# Patient Record
Sex: MALE | Race: WHITE | NOT HISPANIC OR LATINO | URBAN - METROPOLITAN AREA
[De-identification: names, ages, dates, MRNs, and addresses within clinical notes are randomized per-mention and may not be internally consistent; named-entity substitution may affect disease eponyms.]

---

## 2017-01-31 ENCOUNTER — INPATIENT (INPATIENT)
Facility: HOSPITAL | Age: 42
LOS: 0 days | Discharge: HOME | End: 2017-02-01
Attending: INTERNAL MEDICINE | Admitting: INTERNAL MEDICINE
Payer: COMMERCIAL

## 2017-01-31 VITALS
TEMPERATURE: 98 F | OXYGEN SATURATION: 100 % | SYSTOLIC BLOOD PRESSURE: 129 MMHG | RESPIRATION RATE: 16 BRPM | DIASTOLIC BLOOD PRESSURE: 88 MMHG | HEART RATE: 72 BPM

## 2017-01-31 DIAGNOSIS — R55 SYNCOPE AND COLLAPSE: ICD-10-CM

## 2017-01-31 DIAGNOSIS — Z98.890 OTHER SPECIFIED POSTPROCEDURAL STATES: Chronic | ICD-10-CM

## 2017-01-31 DIAGNOSIS — I48.91 UNSPECIFIED ATRIAL FIBRILLATION: ICD-10-CM

## 2017-01-31 DIAGNOSIS — Z41.8 ENCOUNTER FOR OTHER PROCEDURES FOR PURPOSES OTHER THAN REMEDYING HEALTH STATE: ICD-10-CM

## 2017-01-31 LAB
ALBUMIN SERPL ELPH-MCNC: 3.8 G/DL — SIGNIFICANT CHANGE UP (ref 3.3–5)
ALBUMIN SERPL ELPH-MCNC: 3.9 G/DL — SIGNIFICANT CHANGE UP (ref 3.3–5)
ALP SERPL-CCNC: 40 U/L — SIGNIFICANT CHANGE UP (ref 40–120)
ALP SERPL-CCNC: 43 U/L — SIGNIFICANT CHANGE UP (ref 40–120)
ALT FLD-CCNC: 10 U/L — SIGNIFICANT CHANGE UP (ref 4–41)
ALT FLD-CCNC: 11 U/L — SIGNIFICANT CHANGE UP (ref 4–41)
APTT BLD: 26.7 SEC — LOW (ref 27.5–37.4)
APTT BLD: 63 SEC — HIGH (ref 27.5–37.4)
APTT BLD: 84 SEC — HIGH (ref 27.5–37.4)
AST SERPL-CCNC: 14 U/L — SIGNIFICANT CHANGE UP (ref 4–40)
AST SERPL-CCNC: 16 U/L — SIGNIFICANT CHANGE UP (ref 4–40)
BASE EXCESS BLDV CALC-SCNC: 1 MMOL/L — SIGNIFICANT CHANGE UP
BASOPHILS # BLD AUTO: 0.03 K/UL — SIGNIFICANT CHANGE UP (ref 0–0.2)
BASOPHILS NFR BLD AUTO: 0.3 % — SIGNIFICANT CHANGE UP (ref 0–2)
BILIRUB SERPL-MCNC: 0.9 MG/DL — SIGNIFICANT CHANGE UP (ref 0.2–1.2)
BILIRUB SERPL-MCNC: 1.1 MG/DL — SIGNIFICANT CHANGE UP (ref 0.2–1.2)
BLOOD GAS VENOUS - CREATININE: 0.87 MG/DL — SIGNIFICANT CHANGE UP (ref 0.5–1.3)
BUN SERPL-MCNC: 18 MG/DL — SIGNIFICANT CHANGE UP (ref 7–23)
BUN SERPL-MCNC: 21 MG/DL — SIGNIFICANT CHANGE UP (ref 7–23)
CALCIUM SERPL-MCNC: 8.8 MG/DL — SIGNIFICANT CHANGE UP (ref 8.4–10.5)
CALCIUM SERPL-MCNC: 8.9 MG/DL — SIGNIFICANT CHANGE UP (ref 8.4–10.5)
CHLORIDE BLDV-SCNC: 108 MMOL/L — SIGNIFICANT CHANGE UP (ref 96–108)
CHLORIDE SERPL-SCNC: 104 MMOL/L — SIGNIFICANT CHANGE UP (ref 98–107)
CHLORIDE SERPL-SCNC: 105 MMOL/L — SIGNIFICANT CHANGE UP (ref 98–107)
CHOLEST SERPL-MCNC: 132 MG/DL — SIGNIFICANT CHANGE UP (ref 120–199)
CK MB BLD-MCNC: 3.23 NG/ML — SIGNIFICANT CHANGE UP (ref 1–6.6)
CK MB BLD-MCNC: 3.73 NG/ML — SIGNIFICANT CHANGE UP (ref 1–6.6)
CK MB BLD-MCNC: SIGNIFICANT CHANGE UP (ref 0–2.5)
CK SERPL-CCNC: 115 U/L — SIGNIFICANT CHANGE UP (ref 30–200)
CK SERPL-CCNC: 140 U/L — SIGNIFICANT CHANGE UP (ref 30–200)
CO2 SERPL-SCNC: 24 MMOL/L — SIGNIFICANT CHANGE UP (ref 22–31)
CO2 SERPL-SCNC: 26 MMOL/L — SIGNIFICANT CHANGE UP (ref 22–31)
CREAT SERPL-MCNC: 0.78 MG/DL — SIGNIFICANT CHANGE UP (ref 0.5–1.3)
CREAT SERPL-MCNC: 0.87 MG/DL — SIGNIFICANT CHANGE UP (ref 0.5–1.3)
EOSINOPHIL # BLD AUTO: 0.17 K/UL — SIGNIFICANT CHANGE UP (ref 0–0.5)
EOSINOPHIL NFR BLD AUTO: 1.9 % — SIGNIFICANT CHANGE UP (ref 0–6)
GAS PNL BLDV: 138 MMOL/L — SIGNIFICANT CHANGE UP (ref 136–146)
GLUCOSE BLDV-MCNC: 108 — HIGH (ref 70–99)
GLUCOSE SERPL-MCNC: 109 MG/DL — HIGH (ref 70–99)
GLUCOSE SERPL-MCNC: 90 MG/DL — SIGNIFICANT CHANGE UP (ref 70–99)
HBA1C BLD-MCNC: 5.5 % — SIGNIFICANT CHANGE UP (ref 4–5.6)
HCO3 BLDV-SCNC: 25 MMOL/L — SIGNIFICANT CHANGE UP (ref 20–27)
HCT VFR BLD CALC: 36.6 % — LOW (ref 39–50)
HCT VFR BLD CALC: 37.9 % — LOW (ref 39–50)
HCT VFR BLD CALC: 39.6 % — SIGNIFICANT CHANGE UP (ref 39–50)
HCT VFR BLDV CALC: 38.6 % — LOW (ref 39–51)
HDLC SERPL-MCNC: 44 MG/DL — SIGNIFICANT CHANGE UP (ref 35–55)
HGB BLD-MCNC: 12.2 G/DL — LOW (ref 13–17)
HGB BLD-MCNC: 12.5 G/DL — LOW (ref 13–17)
HGB BLD-MCNC: 13.1 G/DL — SIGNIFICANT CHANGE UP (ref 13–17)
HGB BLDV-MCNC: 12.5 G/DL — LOW (ref 13–17)
IMM GRANULOCYTES NFR BLD AUTO: 0.3 % — SIGNIFICANT CHANGE UP (ref 0–1.5)
INR BLD: 1.16 — SIGNIFICANT CHANGE UP (ref 0.87–1.18)
LACTATE BLDV-MCNC: 0.8 MMOL/L — SIGNIFICANT CHANGE UP (ref 0.5–2)
LIPID PNL WITH DIRECT LDL SERPL: 101 MG/DL — SIGNIFICANT CHANGE UP
LYMPHOCYTES # BLD AUTO: 1.91 K/UL — SIGNIFICANT CHANGE UP (ref 1–3.3)
LYMPHOCYTES # BLD AUTO: 21.5 % — SIGNIFICANT CHANGE UP (ref 13–44)
MAGNESIUM SERPL-MCNC: 2.1 MG/DL — SIGNIFICANT CHANGE UP (ref 1.6–2.6)
MCHC RBC-ENTMCNC: 29.3 PG — SIGNIFICANT CHANGE UP (ref 27–34)
MCHC RBC-ENTMCNC: 29.5 PG — SIGNIFICANT CHANGE UP (ref 27–34)
MCHC RBC-ENTMCNC: 29.8 PG — SIGNIFICANT CHANGE UP (ref 27–34)
MCHC RBC-ENTMCNC: 33 % — SIGNIFICANT CHANGE UP (ref 32–36)
MCHC RBC-ENTMCNC: 33.1 % — SIGNIFICANT CHANGE UP (ref 32–36)
MCHC RBC-ENTMCNC: 33.3 % — SIGNIFICANT CHANGE UP (ref 32–36)
MCV RBC AUTO: 89 FL — SIGNIFICANT CHANGE UP (ref 80–100)
MCV RBC AUTO: 89.2 FL — SIGNIFICANT CHANGE UP (ref 80–100)
MCV RBC AUTO: 89.5 FL — SIGNIFICANT CHANGE UP (ref 80–100)
MONOCYTES # BLD AUTO: 0.53 K/UL — SIGNIFICANT CHANGE UP (ref 0–0.9)
MONOCYTES NFR BLD AUTO: 6 % — SIGNIFICANT CHANGE UP (ref 2–14)
NEUTROPHILS # BLD AUTO: 6.23 K/UL — SIGNIFICANT CHANGE UP (ref 1.8–7.4)
NEUTROPHILS NFR BLD AUTO: 70 % — SIGNIFICANT CHANGE UP (ref 43–77)
PCO2 BLDV: 45 MMHG — SIGNIFICANT CHANGE UP (ref 41–51)
PH BLDV: 7.38 PH — SIGNIFICANT CHANGE UP (ref 7.32–7.43)
PHOSPHATE SERPL-MCNC: 4 MG/DL — SIGNIFICANT CHANGE UP (ref 2.5–4.5)
PLATELET # BLD AUTO: 169 K/UL — SIGNIFICANT CHANGE UP (ref 150–400)
PLATELET # BLD AUTO: 181 K/UL — SIGNIFICANT CHANGE UP (ref 150–400)
PLATELET # BLD AUTO: 193 K/UL — SIGNIFICANT CHANGE UP (ref 150–400)
PMV BLD: 10.2 FL — SIGNIFICANT CHANGE UP (ref 7–13)
PMV BLD: 10.8 FL — SIGNIFICANT CHANGE UP (ref 7–13)
PMV BLD: 10.8 FL — SIGNIFICANT CHANGE UP (ref 7–13)
PO2 BLDV: 53 MMHG — HIGH (ref 35–40)
POTASSIUM BLDV-SCNC: 3.6 MMOL/L — SIGNIFICANT CHANGE UP (ref 3.4–4.5)
POTASSIUM SERPL-MCNC: 3.7 MMOL/L — SIGNIFICANT CHANGE UP (ref 3.5–5.3)
POTASSIUM SERPL-MCNC: 3.8 MMOL/L — SIGNIFICANT CHANGE UP (ref 3.5–5.3)
POTASSIUM SERPL-SCNC: 3.7 MMOL/L — SIGNIFICANT CHANGE UP (ref 3.5–5.3)
POTASSIUM SERPL-SCNC: 3.8 MMOL/L — SIGNIFICANT CHANGE UP (ref 3.5–5.3)
PROT SERPL-MCNC: 6.1 G/DL — SIGNIFICANT CHANGE UP (ref 6–8.3)
PROT SERPL-MCNC: 6.2 G/DL — SIGNIFICANT CHANGE UP (ref 6–8.3)
PROTHROM AB SERPL-ACNC: 13.2 SEC — HIGH (ref 10–13.1)
RBC # BLD: 4.09 M/UL — LOW (ref 4.2–5.8)
RBC # BLD: 4.26 M/UL — SIGNIFICANT CHANGE UP (ref 4.2–5.8)
RBC # BLD: 4.44 M/UL — SIGNIFICANT CHANGE UP (ref 4.2–5.8)
RBC # FLD: 13.1 % — SIGNIFICANT CHANGE UP (ref 10.3–14.5)
RBC # FLD: 13.2 % — SIGNIFICANT CHANGE UP (ref 10.3–14.5)
RBC # FLD: 13.3 % — SIGNIFICANT CHANGE UP (ref 10.3–14.5)
SAO2 % BLDV: 85.3 % — HIGH (ref 60–85)
SODIUM SERPL-SCNC: 143 MMOL/L — SIGNIFICANT CHANGE UP (ref 135–145)
SODIUM SERPL-SCNC: 145 MMOL/L — SIGNIFICANT CHANGE UP (ref 135–145)
TRIGL SERPL-MCNC: 53 MG/DL — SIGNIFICANT CHANGE UP (ref 10–149)
TROPONIN T SERPL-MCNC: < 0.06 NG/ML — SIGNIFICANT CHANGE UP (ref 0–0.06)
TROPONIN T SERPL-MCNC: < 0.06 NG/ML — SIGNIFICANT CHANGE UP (ref 0–0.06)
TSH SERPL-MCNC: 1.69 UIU/ML — SIGNIFICANT CHANGE UP (ref 0.27–4.2)
WBC # BLD: 6.02 K/UL — SIGNIFICANT CHANGE UP (ref 3.8–10.5)
WBC # BLD: 7.85 K/UL — SIGNIFICANT CHANGE UP (ref 3.8–10.5)
WBC # BLD: 8.9 K/UL — SIGNIFICANT CHANGE UP (ref 3.8–10.5)
WBC # FLD AUTO: 6.02 K/UL — SIGNIFICANT CHANGE UP (ref 3.8–10.5)
WBC # FLD AUTO: 7.85 K/UL — SIGNIFICANT CHANGE UP (ref 3.8–10.5)
WBC # FLD AUTO: 8.9 K/UL — SIGNIFICANT CHANGE UP (ref 3.8–10.5)

## 2017-01-31 PROCEDURE — 99223 1ST HOSP IP/OBS HIGH 75: CPT

## 2017-01-31 PROCEDURE — 70450 CT HEAD/BRAIN W/O DYE: CPT | Mod: 26

## 2017-01-31 PROCEDURE — 71020: CPT | Mod: 26

## 2017-01-31 RX ORDER — HEPARIN SODIUM 5000 [USP'U]/ML
INJECTION INTRAVENOUS; SUBCUTANEOUS
Qty: 25000 | Refills: 0 | Status: DISCONTINUED | OUTPATIENT
Start: 2017-01-31 | End: 2017-02-01

## 2017-01-31 RX ORDER — HEPARIN SODIUM 5000 [USP'U]/ML
5500 INJECTION INTRAVENOUS; SUBCUTANEOUS EVERY 6 HOURS
Qty: 0 | Refills: 0 | Status: DISCONTINUED | OUTPATIENT
Start: 2017-01-31 | End: 2017-02-01

## 2017-01-31 RX ORDER — METOPROLOL TARTRATE 50 MG
25 TABLET ORAL
Qty: 0 | Refills: 0 | Status: DISCONTINUED | OUTPATIENT
Start: 2017-01-31 | End: 2017-02-01

## 2017-01-31 RX ORDER — HEPARIN SODIUM 5000 [USP'U]/ML
2500 INJECTION INTRAVENOUS; SUBCUTANEOUS EVERY 6 HOURS
Qty: 0 | Refills: 0 | Status: DISCONTINUED | OUTPATIENT
Start: 2017-01-31 | End: 2017-02-01

## 2017-01-31 RX ORDER — HEPARIN SODIUM 5000 [USP'U]/ML
5500 INJECTION INTRAVENOUS; SUBCUTANEOUS ONCE
Qty: 0 | Refills: 0 | Status: COMPLETED | OUTPATIENT
Start: 2017-01-31 | End: 2017-01-31

## 2017-01-31 RX ADMIN — HEPARIN SODIUM 5500 UNIT(S): 5000 INJECTION INTRAVENOUS; SUBCUTANEOUS at 06:53

## 2017-01-31 RX ADMIN — HEPARIN SODIUM 1200 UNIT(S)/HR: 5000 INJECTION INTRAVENOUS; SUBCUTANEOUS at 14:27

## 2017-01-31 RX ADMIN — HEPARIN SODIUM 1200 UNIT(S)/HR: 5000 INJECTION INTRAVENOUS; SUBCUTANEOUS at 21:50

## 2017-01-31 RX ADMIN — HEPARIN SODIUM 1200 UNIT(S)/HR: 5000 INJECTION INTRAVENOUS; SUBCUTANEOUS at 06:47

## 2017-01-31 NOTE — H&P ADULT. - RS GEN PE MLT RESP DETAILS PC
no wheezes/clear to auscultation bilaterally/respirations non-labored/no chest wall tenderness/airway patent/no intercostal retractions/no rales/normal/no rhonchi/breath sounds equal

## 2017-01-31 NOTE — H&P ADULT. - NEGATIVE NEUROLOGICAL SYMPTOMS
no confusion/no hemiparesis/no vertigo/no loss of sensation/no transient paralysis/no weakness/no generalized seizures/no headache/no paresthesias/no focal seizures/no difficulty walking/no tremors

## 2017-01-31 NOTE — ED PROVIDER NOTE - NS ED MD SCRIBE ATTENDING SCRIBE SECTIONS
REVIEW OF SYSTEMS/HISTORY OF PRESENT ILLNESS/VITAL SIGNS( Pullset)/DISPOSITION/PAST MEDICAL/SURGICAL/SOCIAL HISTORY/HIV

## 2017-01-31 NOTE — H&P ADULT. - NEGATIVE GASTROINTESTINAL SYMPTOMS
no constipation/no hematochezia/no vomiting/no melena/no abdominal pain/no change in bowel habits/no nausea

## 2017-01-31 NOTE — H&P ADULT. - ASSESSMENT
42 y/o M with PMH of Afib(s/p 2 ablations last one in 1/16, d/c Pradaxa and Aspirin on 11/16) presented to the ED for a witnessed syncope. In the ED patient went back to atrial fibrillation.    +Syncope   +Atrial fibrillation-Once CT head is negative start heparin drip for anticoagulation

## 2017-01-31 NOTE — H&P ADULT. - NEGATIVE MUSCULOSKELETAL SYMPTOMS
no muscle cramps/no neck pain/no arthritis/no muscle weakness/no joint swelling/no myalgia/no arthralgia/no stiffness

## 2017-01-31 NOTE — H&P ADULT. - PROBLEM SELECTOR PLAN 1
Admit to telemetry, serial CE's, serial EKG  HgbA1C, TSH, lipid profile, CBC, CMP in am   TTE ordered to evaluate LVEF   Orthostatics ordered, if positive will start patient on maintenance IVF   Follow up MD note  Fall precautions ordered

## 2017-01-31 NOTE — ED PROVIDER NOTE - ATTENDING CONTRIBUTION TO CARE
pt presents after an episode of syncope. Here in the ED there is a normal neuro exam. Found to be in a-fib. Has already had 2 ablations for the same a-fib episodes.  Need to consider the fact that this syncope event was related to an arrythmia.  As such will need to be admitted for tele monitoring and possible re-conversion of afib.  No indication for CT as no signs of trauma and does not appear to be neurological in nature.

## 2017-01-31 NOTE — ED PROVIDER NOTE - OBJECTIVE STATEMENT
42 y/o M w/ PMHx of A-fib s/p 2 ablations last one 01/16, presenting to the ED s/p syncopal episode. Pt notes at around 22:30-23:00 today, he felt SOB, chest pressure and couldn't hear anything. Pt states while sitting on daughter's bed he had a syncopal episode for about 1 min, no pre-seizing symptoms, witnessed by wife. As per wife, once the pt woke up he was lethargic. EMS was called and pt was brought to ED. Pt had similar episode in 2012, pt was cardioverted and admitted to ICU, but unsure of what caused the syncope. Pt's last cardiac follow up/EKG was in 11/16, which was NSR as per wife. Wife states the past couple of days pt has had loose stools and decreased PO intake. Pt has discontinued Pradaxa and Aspirin for A-fib in 11/16. Denies fever, chills, vomiting, abd pain, urinary sx or any other complaints.

## 2017-01-31 NOTE — H&P ADULT. - ATTENDING COMMENTS
Assessment  1. Paroxysmal atrial fibrillation  2. Syncope    Plan  1. Beta blockers and aspirin  2. EP consult  3. TTE

## 2017-01-31 NOTE — H&P ADULT. - HISTORY OF PRESENT ILLNESS
40 y/o M with PMH of Afib(s/p 2 ablations last one in 1/16, d/c Pradaxa and Aspirin on 11/16) presented to the ED for a witnessed syncope. Patient stated that he was his normal self yesterday and around 10:30 pm while sitting in his daughter's room with his wife he developed SOB, chest pressure and couldn't hear anything. The next thing he remembers is his wife calling EMS and people around him. Patient denied any pre-syncopal triggers such as lightheadedness or dizziness. Patient thinks he had LOC for about 1 minute, and denied any head trauma, tongue laceration, bowel or bladder incontinence. Patient stated that he had a similar episode in 2012, and patient that time was in atrial fibrillation and ended up getting cardioverted and admitted to the ICU. Patient stated that the last time he saw a cardiologist was on 11/16 and had an EKG and was told that "it was normal, and his Pradaxa and Aspirin was discontinued". Patient stated that when he woke up yesterday after the syncopal episode he did not remember anything other than sitting on the bed with his daughter. Patient also endorsed decreased PO intake, and loose stool for the past couple of days. Patient denied any CP, palpitations, SOB, N/V/C, headaches, fevers, chills, melena, hematochezia, abdominal pain, dysuria, sick contact, cough, pleuritic or positional chest pain. Of note patient stated that 2 week ago he went to [place].     On ED admission EKG revealed atrial fibrillation at a rate of 68, CE x 1: Negative, H&H: 12.5/37.9. Prelim CXR: No urgent/emergent findings. When examined patient is resting in the stretcher and stated that "he is feeling much better than when he came in".

## 2017-01-31 NOTE — H&P ADULT. - NEGATIVE OPHTHALMOLOGIC SYMPTOMS
no diplopia/no discharge R/no photophobia/no lacrimation R/no lacrimation L/no blurred vision L/no blurred vision R/no discharge L

## 2017-01-31 NOTE — H&P ADULT. - NEGATIVE ENMT SYMPTOMS
no vertigo/no sinus symptoms/no tinnitus/no ear pain/no hearing difficulty/no nasal congestion/no nasal discharge

## 2017-01-31 NOTE — ED ADULT TRIAGE NOTE - CHIEF COMPLAINT QUOTE
pt brought in by EMS, as per wife pt is not acting at baseline. pt c.o chest pressure and sob 1 hour ago. pt appears lethargic. pmh ablationx2 with ICU admission.

## 2017-01-31 NOTE — ED PROVIDER NOTE - PROGRESS NOTE DETAILS
Deanna Cortez MD - The scribe's documentation has been prepared under my direction and personally reviewed by me in its entirety. I confirm that the note above accurately reflects all work, treatment, procedures, and medical decision making performed by me. Discussed need to admit with patient & discussed risk and benefits.  Patient agreed to admission.  Discussed case w/ admitting cardiologist - agreed to admit to their service.  Tele PA contacted.

## 2017-01-31 NOTE — ED PROVIDER NOTE - MEDICAL DECISION MAKING DETAILS
42 y/o M w/ PMHx of A-fib s/p 2 ablations last one 01/16, presenting to the ED s/p syncopal episode in a fib not ac - syncope work up, afib management

## 2017-01-31 NOTE — ED ADULT NURSE NOTE - OBJECTIVE STATEMENT
Patient arrives lethargic, A*Ox3 BIBA for near syncopal episode at home this PM. As per wife pt is not acting at baseline. Per wife, patient was complaining of chest pressure and sob 1 hour ago. Pmh ablationx2 with ICU admission 2 years ago. Patient noted to be in AFib per monitor, states last check up he was in NSR. Denies complaints at this time. Respirations are even and unlabored. VS as noted. 20G IV access obtained, labs drawn and sent. MD at bedside evaluating. Will continue to monitor patient closely.

## 2017-01-31 NOTE — H&P ADULT. - PROBLEM SELECTOR PLAN 3
VTE score of 1, CT head ordered, once it is negative will start patient on heparin drip for anticoagulation

## 2017-01-31 NOTE — H&P ADULT. - PROBLEM SELECTOR PLAN 2
Patient with history of  2 ablations and cardioversion for atrial fibrillation   In ED the EKG revealed Atrial fibrillation. CHADS2 score of 0 but patient with Hx of resistant Atrial fibrillation  Will anticoagulated with heparin drip as per Dr. Bustos's request once CT head negative for any intracranial pathology   Currently rate controlled, if patient develops Afib with RVR will consider starting AVNB ie CCB vs BB   Consider EP consult in am for possible ablation vs DCCV this admission

## 2017-02-01 VITALS
HEART RATE: 67 BPM | RESPIRATION RATE: 14 BRPM | DIASTOLIC BLOOD PRESSURE: 72 MMHG | OXYGEN SATURATION: 99 % | TEMPERATURE: 98 F | SYSTOLIC BLOOD PRESSURE: 114 MMHG

## 2017-02-01 LAB
APTT BLD: 65.6 SEC — HIGH (ref 27.5–37.4)
BUN SERPL-MCNC: 14 MG/DL — SIGNIFICANT CHANGE UP (ref 7–23)
CALCIUM SERPL-MCNC: 9.2 MG/DL — SIGNIFICANT CHANGE UP (ref 8.4–10.5)
CHLORIDE SERPL-SCNC: 105 MMOL/L — SIGNIFICANT CHANGE UP (ref 98–107)
CO2 SERPL-SCNC: 26 MMOL/L — SIGNIFICANT CHANGE UP (ref 22–31)
CREAT SERPL-MCNC: 0.71 MG/DL — SIGNIFICANT CHANGE UP (ref 0.5–1.3)
GLUCOSE SERPL-MCNC: 91 MG/DL — SIGNIFICANT CHANGE UP (ref 70–99)
HCT VFR BLD CALC: 39.6 % — SIGNIFICANT CHANGE UP (ref 39–50)
HGB BLD-MCNC: 13.1 G/DL — SIGNIFICANT CHANGE UP (ref 13–17)
MAGNESIUM SERPL-MCNC: 2.1 MG/DL — SIGNIFICANT CHANGE UP (ref 1.6–2.6)
MCHC RBC-ENTMCNC: 29.6 PG — SIGNIFICANT CHANGE UP (ref 27–34)
MCHC RBC-ENTMCNC: 33.1 % — SIGNIFICANT CHANGE UP (ref 32–36)
MCV RBC AUTO: 89.4 FL — SIGNIFICANT CHANGE UP (ref 80–100)
PLATELET # BLD AUTO: 175 K/UL — SIGNIFICANT CHANGE UP (ref 150–400)
PMV BLD: 11 FL — SIGNIFICANT CHANGE UP (ref 7–13)
POTASSIUM SERPL-MCNC: 3.9 MMOL/L — SIGNIFICANT CHANGE UP (ref 3.5–5.3)
POTASSIUM SERPL-SCNC: 3.9 MMOL/L — SIGNIFICANT CHANGE UP (ref 3.5–5.3)
RBC # BLD: 4.43 M/UL — SIGNIFICANT CHANGE UP (ref 4.2–5.8)
RBC # FLD: 13.3 % — SIGNIFICANT CHANGE UP (ref 10.3–14.5)
SODIUM SERPL-SCNC: 143 MMOL/L — SIGNIFICANT CHANGE UP (ref 135–145)
WBC # BLD: 5.75 K/UL — SIGNIFICANT CHANGE UP (ref 3.8–10.5)
WBC # FLD AUTO: 5.75 K/UL — SIGNIFICANT CHANGE UP (ref 3.8–10.5)

## 2017-02-01 PROCEDURE — 76376 3D RENDER W/INTRP POSTPROCES: CPT | Mod: 26

## 2017-02-01 PROCEDURE — 93306 TTE W/DOPPLER COMPLETE: CPT | Mod: 26

## 2017-02-01 PROCEDURE — 93010 ELECTROCARDIOGRAM REPORT: CPT

## 2017-02-01 PROCEDURE — 93312 ECHO TRANSESOPHAGEAL: CPT | Mod: 26

## 2017-02-01 RX ORDER — DABIGATRAN ETEXILATE MESYLATE 150 MG/1
1 CAPSULE ORAL
Qty: 60 | Refills: 1 | OUTPATIENT
Start: 2017-02-01 | End: 2017-04-01

## 2017-02-01 RX ORDER — DRONEDARONE 400 MG/1
1 TABLET, FILM COATED ORAL
Qty: 60 | Refills: 1 | OUTPATIENT
Start: 2017-02-01 | End: 2017-04-01

## 2017-02-01 RX ORDER — INFLUENZA VIRUS VACCINE 15; 15; 15; 15 UG/.5ML; UG/.5ML; UG/.5ML; UG/.5ML
0.5 SUSPENSION INTRAMUSCULAR ONCE
Qty: 0 | Refills: 0 | Status: DISCONTINUED | OUTPATIENT
Start: 2017-02-01 | End: 2017-02-01

## 2017-02-01 NOTE — DISCHARGE NOTE ADULT - MEDICATION SUMMARY - MEDICATIONS TO TAKE
I will START or STAY ON the medications listed below when I get home from the hospital:    dronedarone 400 mg oral tablet  -- 1 tab(s) by mouth 2 times a day  -- Avoid grapefruit and grapefruit juice while taking this medication.  Do not take this drug if you are pregnant.  Obtain medical advice before taking any non-prescription drugs as some may affect the action of this medication.  Take with food.    -- Indication: For Afib    dabigatran 150 mg oral capsule  -- 1 cap(s) by mouth 2 times a day  -- Do not chew, break, or crush.  It is very important that you take or use this exactly as directed.  Do not skip doses or discontinue unless directed by your doctor.  Obtain medical advice before taking any non-prescription drugs as some may affect the action of this medication.  Swallow whole.  Do not crush.    -- Indication: For Afib

## 2017-02-01 NOTE — DISCHARGE NOTE ADULT - CARE PROVIDER_API CALL
Grace Bustos (), Cardiology; Internal Medicine  [address]  [place] [address]  Phone: [phone]  Fax: [phone]    Armin Galarza), Cardiac Electrophysiology; Cardiovascular Disease  [address] [place] [address]  Phone: [phone]  Fax: [phone]

## 2017-02-01 NOTE — PROVIDER CONTACT NOTE (MEDICATION) - BACKGROUND
Heparin drip never signed off overnight and PTT never drawn overnight. Rate was never adjusted based on new PTT results.

## 2017-02-01 NOTE — DISCHARGE NOTE ADULT - PATIENT PORTAL LINK FT
“You can access the [facility] Patient Portal, offered by [facility], by registering with the following website: [email or web address]”

## 2017-02-01 NOTE — DISCHARGE NOTE ADULT - CARE PLAN
Principal Discharge DX:	Paroxysmal atrial fibrillation  Goal:	to eliminate symptoms  Instructions for follow-up, activity and diet:	Follow up with Dr. Ramirez in 1 week  Secondary Diagnosis:	Syncope, unspecified syncope type

## 2017-02-01 NOTE — DISCHARGE NOTE ADULT - HOSPITAL COURSE
42 y/o male with a PMH of atrial fibrillation (S/P 2 ablations, last one in 1/16) not on A/C, presents to ED for witnessed syncope, found to be back in atrial fibrillation.    + Syncope- S/P negative CT head  + Atrial fibrillation (rate controlled)  CE x2: Negative  CXR: Normal chest radiograph.  CT head: No acute intracranial bleeding, mass effect, or shift.   H/H: 12.5/37.9  Orthostatics: Negative  2/1/17 s/p successful cardioversion. As per Dr. Herrera, will start Pradaxa 150 mg PO  BID, Multaq 400 mg po BID  The patient was recommended to f/u with Dr. Ramirez in 1 week

## 2017-09-20 ENCOUNTER — EMERGENCY (EMERGENCY)
Facility: HOSPITAL | Age: 42
LOS: 1 days | Discharge: HOME | End: 2017-09-20
Attending: EMERGENCY MEDICINE | Admitting: EMERGENCY MEDICINE
Payer: COMMERCIAL

## 2017-09-20 VITALS
OXYGEN SATURATION: 99 % | RESPIRATION RATE: 16 BRPM | SYSTOLIC BLOOD PRESSURE: 106 MMHG | HEART RATE: 70 BPM | TEMPERATURE: 98 F | DIASTOLIC BLOOD PRESSURE: 77 MMHG

## 2017-09-20 DIAGNOSIS — Z98.890 OTHER SPECIFIED POSTPROCEDURAL STATES: Chronic | ICD-10-CM

## 2017-09-20 LAB
ALBUMIN SERPL ELPH-MCNC: 4.3 G/DL — SIGNIFICANT CHANGE UP (ref 3.3–5)
ALP SERPL-CCNC: 55 U/L — SIGNIFICANT CHANGE UP (ref 40–120)
ALT FLD-CCNC: 25 U/L — SIGNIFICANT CHANGE UP (ref 4–41)
ANISOCYTOSIS BLD QL: SLIGHT — SIGNIFICANT CHANGE UP
APTT BLD: 28.9 SEC — SIGNIFICANT CHANGE UP (ref 27.5–37.4)
AST SERPL-CCNC: 25 U/L — SIGNIFICANT CHANGE UP (ref 4–40)
BASOPHILS # BLD AUTO: 0.04 K/UL — SIGNIFICANT CHANGE UP (ref 0–0.2)
BASOPHILS NFR BLD AUTO: 1 % — SIGNIFICANT CHANGE UP (ref 0–2)
BASOPHILS NFR SPEC: 0 % — SIGNIFICANT CHANGE UP (ref 0–2)
BILIRUB SERPL-MCNC: 0.7 MG/DL — SIGNIFICANT CHANGE UP (ref 0.2–1.2)
BUN SERPL-MCNC: 21 MG/DL — SIGNIFICANT CHANGE UP (ref 7–23)
CALCIUM SERPL-MCNC: 9.3 MG/DL — SIGNIFICANT CHANGE UP (ref 8.4–10.5)
CHLORIDE SERPL-SCNC: 104 MMOL/L — SIGNIFICANT CHANGE UP (ref 98–107)
CK MB BLD-MCNC: 2.25 NG/ML — SIGNIFICANT CHANGE UP (ref 1–6.6)
CK MB BLD-MCNC: 2.58 NG/ML — SIGNIFICANT CHANGE UP (ref 1–6.6)
CK MB BLD-MCNC: SIGNIFICANT CHANGE UP (ref 0–2.5)
CK SERPL-CCNC: 89 U/L — SIGNIFICANT CHANGE UP (ref 30–200)
CO2 SERPL-SCNC: 27 MMOL/L — SIGNIFICANT CHANGE UP (ref 22–31)
CREAT SERPL-MCNC: 0.85 MG/DL — SIGNIFICANT CHANGE UP (ref 0.5–1.3)
D DIMER BLD IA.RAPID-MCNC: < 150 NG/ML — SIGNIFICANT CHANGE UP
EOSINOPHIL # BLD AUTO: 0.12 K/UL — SIGNIFICANT CHANGE UP (ref 0–0.5)
EOSINOPHIL NFR BLD AUTO: 2.9 % — SIGNIFICANT CHANGE UP (ref 0–6)
EOSINOPHIL NFR FLD: 5.2 % — SIGNIFICANT CHANGE UP (ref 0–6)
GIANT PLATELETS BLD QL SMEAR: PRESENT — SIGNIFICANT CHANGE UP
GLUCOSE SERPL-MCNC: 91 MG/DL — SIGNIFICANT CHANGE UP (ref 70–99)
HBA1C BLD-MCNC: 5.7 % — HIGH (ref 4–5.6)
HCT VFR BLD CALC: 40.1 % — SIGNIFICANT CHANGE UP (ref 39–50)
HGB BLD-MCNC: 13.3 G/DL — SIGNIFICANT CHANGE UP (ref 13–17)
IMM GRANULOCYTES # BLD AUTO: 0.01 # — SIGNIFICANT CHANGE UP
IMM GRANULOCYTES NFR BLD AUTO: 0.2 % — SIGNIFICANT CHANGE UP (ref 0–1.5)
INR BLD: 1 — SIGNIFICANT CHANGE UP (ref 0.88–1.17)
LYMPHOCYTES # BLD AUTO: 1.37 K/UL — SIGNIFICANT CHANGE UP (ref 1–3.3)
LYMPHOCYTES # BLD AUTO: 33 % — SIGNIFICANT CHANGE UP (ref 13–44)
LYMPHOCYTES NFR SPEC AUTO: 28.1 % — SIGNIFICANT CHANGE UP (ref 13–44)
MACROCYTES BLD QL: SLIGHT — SIGNIFICANT CHANGE UP
MCHC RBC-ENTMCNC: 29.6 PG — SIGNIFICANT CHANGE UP (ref 27–34)
MCHC RBC-ENTMCNC: 33.2 % — SIGNIFICANT CHANGE UP (ref 32–36)
MCV RBC AUTO: 89.3 FL — SIGNIFICANT CHANGE UP (ref 80–100)
MONOCYTES # BLD AUTO: 0.69 K/UL — SIGNIFICANT CHANGE UP (ref 0–0.9)
MONOCYTES NFR BLD AUTO: 16.6 % — HIGH (ref 2–14)
MONOCYTES NFR BLD: 7 % — SIGNIFICANT CHANGE UP (ref 2–9)
NEUTROPHIL AB SER-ACNC: 50 % — SIGNIFICANT CHANGE UP (ref 43–77)
NEUTROPHILS # BLD AUTO: 1.92 K/UL — SIGNIFICANT CHANGE UP (ref 1.8–7.4)
NEUTROPHILS NFR BLD AUTO: 46.3 % — SIGNIFICANT CHANGE UP (ref 43–77)
NEUTS BAND # BLD: 4.4 % — SIGNIFICANT CHANGE UP (ref 0–6)
NRBC # FLD: 0 — SIGNIFICANT CHANGE UP
PLATELET # BLD AUTO: 137 K/UL — LOW (ref 150–400)
PLATELET COUNT - ESTIMATE: SIGNIFICANT CHANGE UP
PMV BLD: 9.7 FL — SIGNIFICANT CHANGE UP (ref 7–13)
POIKILOCYTOSIS BLD QL AUTO: SLIGHT — SIGNIFICANT CHANGE UP
POTASSIUM SERPL-MCNC: 4.2 MMOL/L — SIGNIFICANT CHANGE UP (ref 3.5–5.3)
POTASSIUM SERPL-SCNC: 4.2 MMOL/L — SIGNIFICANT CHANGE UP (ref 3.5–5.3)
PROT SERPL-MCNC: 7.3 G/DL — SIGNIFICANT CHANGE UP (ref 6–8.3)
PROTHROM AB SERPL-ACNC: 11.2 SEC — SIGNIFICANT CHANGE UP (ref 9.8–13.1)
RBC # BLD: 4.49 M/UL — SIGNIFICANT CHANGE UP (ref 4.2–5.8)
RBC # FLD: 12.6 % — SIGNIFICANT CHANGE UP (ref 10.3–14.5)
REVIEW TO FOLLOW: YES — SIGNIFICANT CHANGE UP
SODIUM SERPL-SCNC: 141 MMOL/L — SIGNIFICANT CHANGE UP (ref 135–145)
TROPONIN T SERPL-MCNC: < 0.06 NG/ML — SIGNIFICANT CHANGE UP (ref 0–0.06)
TROPONIN T SERPL-MCNC: < 0.06 NG/ML — SIGNIFICANT CHANGE UP (ref 0–0.06)
VARIANT LYMPHS # BLD: 5.3 % — SIGNIFICANT CHANGE UP
WBC # BLD: 4.15 K/UL — SIGNIFICANT CHANGE UP (ref 3.8–10.5)
WBC # FLD AUTO: 4.15 K/UL — SIGNIFICANT CHANGE UP (ref 3.8–10.5)

## 2017-09-20 PROCEDURE — 99220: CPT

## 2017-09-20 PROCEDURE — 71020: CPT | Mod: 26

## 2017-09-20 NOTE — ED PROVIDER NOTE - MEDICAL DECISION MAKING DETAILS
42 y.o male with Afib non complaint with medications here with weakness and L side chest pressure- r/o acs. contact pts cardiologist.

## 2017-09-20 NOTE — ED CDU PROVIDER NOTE - PLAN OF CARE
Continue taking all your medications previously prescribed. Drink plenty of fluids. Rest; avoid any strenuous activity. Follow up with your PMD in 2 days and cardiologist within one week-bring all the copies of test results and discharge instructions given to you. Return to ED for any worsening symptoms.

## 2017-09-20 NOTE — ED PROVIDER NOTE - OBJECTIVE STATEMENT
42 y.o male pmhx of afib ( unsure of his AC has not taken since May) presents with weakness, L sided chest pressure that started this morning. Pt states woke up this morning and was feeling tired- worked a lot last night so initially though nothing of it. Had 3 episodes of diarrhea and stil didn't feel right so went to urgent care- had EKG performed and was sent here for further evaluation. pt admits to having this chest pain for the past 4 years on and off since he was diagnosed with Afib. Follows with Cardiologist Dr. Idris Ramirez. Denies fevers, chills, SOB, cough, n/v/, abdominal pain, urinary symptoms, numbness, tingling, weakness. Recently traveled to [place] 2 weeks ago.

## 2017-09-20 NOTE — ED PROVIDER NOTE - ATTENDING CONTRIBUTION TO CARE
DR Bloch- Patient examined, Hx taken, WEll appearing NAD, HEENT nml lungs clear, abd soft, heart sounds nl murmur, ECG no acute changes, recent Echo nml. HX of afib with two ablations. No evidence of PAF now. In sinus rhythm.

## 2017-09-20 NOTE — ED PROVIDER NOTE - PROGRESS NOTE DETAILS
DR Bloch- Spoke with cardiologist Dr Sharma-  Cardiologist at [facility]. patient had stress test 2011 ( can call for results [phone]) He agrees with stress. and then he can f/u for Holter,  At this point , may not need anticoagulation .

## 2017-09-20 NOTE — ED CDU PROVIDER NOTE - ATTENDING CONTRIBUTION TO CARE
CDU Attending Dr. Morocho: 41 yo male with afib (?paroxysmal, prescribed Pradaxa but not taking) in ED with left-sided chest pressure beginning last night.  Symptoms intermittent, non-exertional.  No other associated symptoms.  Placed in CDU for stress and telemetry.  Decision made in CDU to also get echo to evaluate for possible cardiac clot given afib not on anticoagulation.  At time of my evaluation pt without symptoms, feels well.  On exam pt well appearing, in NAD, heart RRR, lungs CTAB, abd NTND, extremities without swelling, strength 5/5 in all extremities and skin without rash.  Not in afib in ED.

## 2017-09-20 NOTE — ED CDU PROVIDER NOTE - PROGRESS NOTE DETAILS
ANAMARIA HUNT - Pt resting comfortably, asleep. VSS, CE x 2 negative, pending stress in am, will continue to monitor and reassess. CDU DC note Alyssa: Seen and examined, have discussed plan of care with PA.   I agree with note as stated above, having amended the EMR as needed to reflect the findings. Pt. NAD in ED, no CP/epigastric pain during stress test, NAD at time of eval. Pt. given copy of results, to FU with PMD and Cardiology. Not on anticoagulation, has been in NSR for duration of ED stay, will take ASA daily.

## 2017-09-20 NOTE — ED PROVIDER NOTE - FAMILY HISTORY
Grandparent  Still living? Unknown  Family history of myocardial infarction, Age at diagnosis: Age Unknown

## 2017-09-20 NOTE — ED CDU PROVIDER NOTE - OBJECTIVE STATEMENT
42 y.o male pmhx of afib ( unsure of his AC has not taken since May) presents with weakness, L sided chest pressure that started this morning. Pt states woke up this morning and was feeling tired- worked a lot last night so initially though nothing of it. Had 3 episodes of diarrhea and stil didn't feel right so went to urgent care- had EKG performed and was sent here for further evaluation. pt admits to having this chest pain for the past 4 years on and off since he was diagnosed with Afib. Follows with Cardiologist Dr. Idris Ramirez. Denies fevers, chills, SOB, cough, n/v/, abdominal pain, urinary symptoms, numbness, tingling, weakness. Recently traveled to [place] 2 weeks ago.    CDU ANAMARIA Vyas:  42 yr old male with hx of a.fib ( unsure of meds, has been non compliant since May) presents with generalized weakness and left sided chest pressure, intermittent since this morning. Pt went to urgent care, and EKG done which was abnormal, pt was sent to ED for further eval. Pt took asa this am when symptoms started. Denies any fever, chills, or sob. Pt follows cardiologist- Dr. Edmund Ramirez, last seen in April/ May. Hx of ablation. Pt denies any hx of nuclear stress test.   fam hx of MI - grandmother   In cdu for tele monitoring, ce x2 and nuclear stress test.

## 2017-09-20 NOTE — ED ADULT TRIAGE NOTE - CHIEF COMPLAINT QUOTE
pt states that he awoke feeling weak and tired, left sided chest tightness.  Pt states he has a hx of afib, syncope.  +blood thinner use, currently having chest discomfort.

## 2017-09-21 VITALS
HEART RATE: 88 BPM | SYSTOLIC BLOOD PRESSURE: 116 MMHG | TEMPERATURE: 96 F | RESPIRATION RATE: 16 BRPM | OXYGEN SATURATION: 100 % | DIASTOLIC BLOOD PRESSURE: 60 MMHG

## 2017-09-21 PROCEDURE — 93306 TTE W/DOPPLER COMPLETE: CPT | Mod: 26

## 2017-09-21 PROCEDURE — 99217: CPT

## 2018-10-17 NOTE — ED ADULT NURSE NOTE - EXTENSIONS OF SELF_ADULT
[Very Good] : ~his/her~  mood as very good [0] : 2) Feeling down, depressed, or hopeless: Not at all (0) [Patient reported mammogram was normal] : Patient reported mammogram was normal [MammogramDate] : 09/18 None

## 2019-03-14 NOTE — H&P ADULT. - PROBLEM/PLAN-2
no diaphoresis/no nausea/no chills/no syncope/no congestion/no dizziness/no fever/no shortness of breath/no vomiting/no back pain
DISPLAY PLAN FREE TEXT

## 2020-05-08 NOTE — H&P ADULT. - TOBACCO USE
Group Topic: BH Therapeutic Activity    Date: 5/8/2020  Start Time: 11:00 AM  End Time: 11:45 AM  Facilitators: Brigid Reico RN; Ayanna Finley MSW    Focus: Yoga  Number in attendance: 6    Method: Group  Attendance: Left group at [phone] for phone call  Participation: Minimal  Patient Response: Able to return demonstration  Mood: Depressed  Affect: Blunted  Behavior/Socialization: Appropriate to group and Cooperative  Thought Process: Focused  Task Performance: Follows directions Participated most of the time but took phone call and layed on the floor most of the time with his hat over his eyes.     Never smoker

## 2022-12-14 NOTE — PATIENT PROFILE ADULT. - ANESTHESIA, PREVIOUS REACTION, PROFILE
Hospitalist Progress Note      PCP: Chasity Myers MD (Inactive)    Date of Admission: 12/12/2022      Hospital Course: This is a 25-year-old male with no significant past medical history who  has no past medical history on file. presents with palpitation started 4 hr PTA, was at work, had intermittent similar episodes since age 15 but this was longest he experienced for approximately 30min. Denies drug use, no fever or chills, felt some chest discomfort, no nausea or vomiting no SOB no leg swelling. EKG was done in ER showed wide complex tachycardia VT, given lidocaine and converted currently in normal sinus rhythm. Subjective: Pt was seen and examined at bedside. No acute event overnight; denies any CP, SOB or palpitation. Medications:  Reviewed    Infusion Medications    sodium chloride       Scheduled Medications    sodium chloride flush  10 mL IntraVENous 2 times per day     PRN Meds: sodium chloride flush, sodium chloride, ondansetron **OR** ondansetron, magnesium hydroxide, acetaminophen **OR** acetaminophen      Intake/Output Summary (Last 24 hours) at 12/14/2022 1138  Last data filed at 12/13/2022 1726  Gross per 24 hour   Intake 480 ml   Output --   Net 480 ml         Exam:    /67   Pulse 100   Temp 97.5 °F (36.4 °C) (Oral)   Resp 12   Ht 6' (1.829 m)   Wt 150 lb (68 kg)   SpO2 97%   BMI 20.34 kg/m²     General appearance: Sitting comfortably in bed. No apparent distress. Respiratory: Clear to auscultation bilaterally. Cardiovascular: Normal S1/S2. Regular rhythm and rate. Abdomen: Soft, non-tender, non-distended with normal bowel sounds. Musculoskeletal: No clubbing, cyanosis or edema bilaterally. Skin: Skin color, texture, turgor normal.  No rashes or lesions. Neurologic:  No focal deficit.   Psychiatric: Alert and oriented, thought content appropriate, normal insight  Peripheral Pulses: +2 palpable, equal bilaterally       Labs:   Recent Labs     12/12/22  0138 12/13/22  0824   WBC 8.8 4.2*   HGB 15.0 14.7   HCT 46.7 44.7    226       Recent Labs     12/12/22  0222 12/13/22  0824 12/14/22  0727    139  --    K 3.7 4.0  --     106  --    CO2 27 21*  --    BUN 11 9  --    CREATININE 1.1 0.9  --    CALCIUM 10.1 9.8  --    PHOS  --  3.5 5.8*       Recent Labs     12/13/22  0824   AST 20   ALT 16   BILITOT 0.5   ALKPHOS 82       No results for input(s): INR in the last 72 hours. No results for input(s): Moo Bigness in the last 72 hours. Radiology:  MRI CARDIAC W WO CONTRAST   Final Result   1. Normal LV size and function, LVEF 57%. 2.  No regional wall motion abnormalities. 3.  Normal RV size and function. 4.  No significant valvular heart disease. 5.  No evidence of intramyocardial fibrosis, inflammatory pattern,   infiltrative disease or prior infarct. 6.  No evidence of pericardial effusion. Pastor March MD, [facility] Mulligan 9 cardiology      XR CHEST PORTABLE   Final Result   No acute cardiopulmonary process.                    Active Hospital Problems    Diagnosis Date Noted    Elevated troponin [R77.8] 12/13/2022     Priority: Medium    Ventricular tachyarrhythmia [I47.20] 12/12/2022     Priority: Medium    Palpitation [R00.2] 12/12/2022     Priority: Medium    Wide-complex tachycardia [R00.0] 12/12/2022     Priority: Medium       Assessment  Ventricular tachyarrhythmia - wide complex tachycardic likely SVT with aberrancy    Chest pain - secondary to above, ACS not likely  Elevated Troponin - likely due to demand ischemia in the setting of rapid heart rate  Palpitation    Plan:  Continue with tele monitor  Electrophysiologist on board, scheduled for EP study this afternoon  PRN analgesia  Activity as tolerated      DVT Prophylaxis:     Diet: Diet NPO Exceptions are: Sips of Water with Meds, Ice Chips  Code Status: Full Code    PT/OT Eval Status: N/A    Dispo - Home once stable    Justice Pineda MD 12/14/2022 11:38 AM none

## 2023-04-07 NOTE — ED CDU PROVIDER NOTE - CONSTITUTIONAL NEGATIVE STATEMENT, MLM
no fever and no chills.
Performing Laboratory: 0
Billing Type: Third-Party Bill
Bill For Surgical Tray: no
Expected Date Of Service: 04/07/2023

## 2024-10-03 NOTE — ED ADULT NURSE NOTE - OBJECTIVE STATEMENT
Pt received to room 16. Pt complaining of left sided chest pain/ tightness that started this morning and feeling weak and tired. Pt states he was seen at an urgent care center and told to come to the ED. Pt states he has a PMHx of Afib on blood thinners. Pt placed on cardiac monitor, IV access obtained, labs drawn and sent.  Will continue to monitor.
Physical therapy